# Patient Record
Sex: MALE | Race: WHITE | Employment: OTHER | URBAN - METROPOLITAN AREA
[De-identification: names, ages, dates, MRNs, and addresses within clinical notes are randomized per-mention and may not be internally consistent; named-entity substitution may affect disease eponyms.]

---

## 2023-02-22 ENCOUNTER — TREATMENT (OUTPATIENT)
Dept: PHYSICAL THERAPY | Facility: CLINIC | Age: 63
End: 2023-02-22
Payer: OTHER MISCELLANEOUS

## 2023-02-22 DIAGNOSIS — M54.50 CHRONIC BILATERAL LOW BACK PAIN WITHOUT SCIATICA: Primary | ICD-10-CM

## 2023-02-22 DIAGNOSIS — R26.9 GAIT DISTURBANCE: ICD-10-CM

## 2023-02-22 DIAGNOSIS — G89.29 CHRONIC BILATERAL LOW BACK PAIN WITHOUT SCIATICA: Primary | ICD-10-CM

## 2023-02-22 DIAGNOSIS — R26.89 BALANCE PROBLEM: ICD-10-CM

## 2023-02-22 DIAGNOSIS — R53.1 WEAKNESS: ICD-10-CM

## 2023-02-22 PROCEDURE — 97162 PT EVAL MOD COMPLEX 30 MIN: CPT | Performed by: PHYSICAL THERAPIST

## 2023-02-22 PROCEDURE — 97530 THERAPEUTIC ACTIVITIES: CPT | Performed by: PHYSICAL THERAPIST
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
## 2023-03-08 ENCOUNTER — TREATMENT (OUTPATIENT)
Dept: PHYSICAL THERAPY | Facility: CLINIC | Age: 63
End: 2023-03-08
Payer: OTHER MISCELLANEOUS

## 2023-03-08 DIAGNOSIS — R53.1 WEAKNESS: ICD-10-CM

## 2023-03-08 DIAGNOSIS — R26.89 BALANCE PROBLEM: ICD-10-CM

## 2023-03-08 DIAGNOSIS — G89.29 CHRONIC BILATERAL LOW BACK PAIN WITHOUT SCIATICA: Primary | ICD-10-CM

## 2023-03-08 DIAGNOSIS — R26.9 GAIT DISTURBANCE: ICD-10-CM

## 2023-03-08 DIAGNOSIS — M54.50 CHRONIC BILATERAL LOW BACK PAIN WITHOUT SCIATICA: Primary | ICD-10-CM

## 2023-03-08 PROCEDURE — 97113 AQUATIC THERAPY/EXERCISES: CPT | Performed by: PHYSICAL THERAPIST

## 2023-03-08 NOTE — PROGRESS NOTES
Physical Therapy Daily Treatment Note    [facility] Therapy [facility]  [address] [place] [address]  [phone] (phone)  [phone] (fax)    Patient: Evan Harkins   : 1960  Diagnosis/ICD-10 Code:  Chronic bilateral low back pain without sciatica [M54.50, G89.29]  Referring practitioner: Kwadwo Cruz MD  Date of Initial Visit: Type: THERAPY  Noted: 2023  Today's Date: 3/8/2023  Patient seen for 2 sessions             Subjective   My back feels weak, but good - took pain medicine an hour ago. Has not got results from CT Scan yet.    Objective     AQUATIC THERAPY:    Precautions per MD: No resisted hip abduction  Patient has aphasia and is at risk for falls, needs one on one at all times.    Patient enters/exits the pool via the stairs w/ both hands on rail and CGA, using a non-reciprocal pattern.    Water Walk  Forwards at rail, HHA 25 ft X 3, Backwards 25 ft., HHA             Seated Hamstrings 20 sec each                                     Vertical Traction  1 min.  X 2  Seated breaks as needed    Abdominals  --                                   Hip Abd/Add  --             Hip Flex/Ext  --             March in Place   10X        Sit to Stands X 5, HHA                B Heel Raises  10X                                       Step Ups   --                 Bicycle Suspended 30 sec X 3 and  Seated 20X  Seated LAQs 10X each                               Flutter/Scissor   --             Assessment/Plan  First session of aquatic therapy.  Mike enjoyed being in the warm water, however he became tired quickly and required multiple rest breaks.  Spoke with his wife who states he has not been very active for the past few years.  They did go to the pool at the [facility], however they no longer allow the public to use the pool.  Plan: Gradually advance exercise.          Timed:  Aquatic Therapy    40     mins 66562;    Sergio Aguilar, PT  Physical  Therapist    [place] License:  [id number]

## 2023-03-13 ENCOUNTER — TREATMENT (OUTPATIENT)
Dept: PHYSICAL THERAPY | Facility: CLINIC | Age: 63
End: 2023-03-13
Payer: OTHER MISCELLANEOUS

## 2023-03-13 DIAGNOSIS — R53.1 WEAKNESS: ICD-10-CM

## 2023-03-13 DIAGNOSIS — R26.9 GAIT DISTURBANCE: ICD-10-CM

## 2023-03-13 DIAGNOSIS — R26.89 BALANCE PROBLEM: ICD-10-CM

## 2023-03-13 DIAGNOSIS — M54.50 CHRONIC BILATERAL LOW BACK PAIN WITHOUT SCIATICA: Primary | ICD-10-CM

## 2023-03-13 DIAGNOSIS — G89.29 CHRONIC BILATERAL LOW BACK PAIN WITHOUT SCIATICA: Primary | ICD-10-CM

## 2023-03-13 PROCEDURE — 97113 AQUATIC THERAPY/EXERCISES: CPT | Performed by: PHYSICAL THERAPIST

## 2023-03-13 NOTE — PROGRESS NOTES
Physical Therapy Daily Treatment Note    [facility]  [address] [place] [address]  [phone] (phone)  [phone] (fax)    Patient: Evan Harkins   : 1960  Diagnosis/ICD-10 Code:  Chronic bilateral low back pain without sciatica [M54.50, G89.29]  Referring practitioner: Kwadwo Cruz MD  Date of Initial Visit: Type: THERAPY  Noted: 2023  Today's Date: 3/13/2023  Patient seen for 3 sessions             Subjective   Wife reports Mike is tired today and that it is not uncommon for him to have days like this where he wants to nap/rest more.    Objective     Precautions per MD: No resisted hip abduction  Patient has aphasia and is at risk for falls, needs one on one at all times.     Patient enters/exits the pool via the stairs w/ railing and HHA/ min A, using a non-reciprocal pattern.    Seated: Bicycling legs 15 X 2                Hamstring Stretch 20 sec X 2 each leg                Marching 15X                LAQs  10X                Small foam dumbbell overhead press alternating X 10 each                Small foam DB on top of water Push-Pulls X 10                Small foam DB on top of water shoulder horiz Abd/Add 3 X 5  Water walking holding rail, HHA 25 ft X 2  Standing with back to wall, Large Noodle Pushdowns 3 X 4  March in place 3X  Decompression on Noodle w/ rail support 30 sec.      Assessment/Plan  Mike was tired/sleepy throughout today's session and required frequent cues to keep his eyes open.  His wife states he has days like this.  He did better with short bouts of seated exercises.  He made an innapropriate comment and gesture during the session and his wife was asked to be present in the pool for the next treatment. ( We do not have any male physical therapists working in aquatics to transfer care to).          Timed:  Aquatic Therapy    30     mins 88875;    Sergio Aguilar, PT  Physical Therapist    [place] License:  [id number]

## 2023-03-17 ENCOUNTER — TREATMENT (OUTPATIENT)
Dept: PHYSICAL THERAPY | Facility: CLINIC | Age: 63
End: 2023-03-17
Payer: OTHER MISCELLANEOUS

## 2023-03-17 DIAGNOSIS — M54.50 CHRONIC BILATERAL LOW BACK PAIN WITHOUT SCIATICA: Primary | ICD-10-CM

## 2023-03-17 DIAGNOSIS — R26.89 BALANCE PROBLEM: ICD-10-CM

## 2023-03-17 DIAGNOSIS — R53.1 WEAKNESS: ICD-10-CM

## 2023-03-17 DIAGNOSIS — R26.9 GAIT DISTURBANCE: ICD-10-CM

## 2023-03-17 DIAGNOSIS — G89.29 CHRONIC BILATERAL LOW BACK PAIN WITHOUT SCIATICA: Primary | ICD-10-CM

## 2023-03-17 PROCEDURE — 97113 AQUATIC THERAPY/EXERCISES: CPT | Performed by: PHYSICAL THERAPIST

## 2023-03-17 NOTE — PROGRESS NOTES
Physical Therapy Daily Treatment Note    [facility]  [address] [place] [address]  [phone] (phone)  [phone] (fax)    Patient: Evan Harkins   : 1960  Diagnosis/ICD-10 Code:  Chronic bilateral low back pain without sciatica [M54.50, G89.29]  Referring practitioner: Kwadwo Cruz MD  Date of Initial Visit: Type: THERAPY  Noted: 2023  Today's Date: 3/17/2023  Patient seen for 4 sessions             Subjective   Wife reports he is more alert today.  She states drowsiness may be from a medication change that occurred ~ 2 weeks ago.    Objective     AQUATIC THERAPY:    Patient enters/exits the pool via the stairs w/ railing and HHA/ min A, using a non-reciprocal pattern.     Seated: Bicycling legs 1 minute X 2                Flutter Kick 20 sec X 2                Hamstring Stretch 20 sec X 2 each leg                Ankle Pumps  X 10 each                Marching 10X                LAQs  10X                Sit to Stands X 6                  Water walking holding rail and HHA 25 ft X 2  Free Standing w/ CGA/min A, Large Noodle Pushdowns X 10  March in place --  Decompression on Noodle w/ rail support 10 sec. X2    Assessment/Plan  Mike was much more alert compared to his first 2 visits.  He was more cooperative in performing therapy exercises.  He did tire towards the end of treatment and requested to stop.  His wife was present in the pool during the treatment and helped encourage Mike to participate.  Balance with sit to  pool required SBA for safety and balance with water walking requires HHA plus onr hand support on the railinge   .        Timed:  Aquatic Therapy    30     mins 39195;    Sergio Aguilar, PT  Physical Therapist    [place] License:  [id number]

## 2023-03-22 ENCOUNTER — TREATMENT (OUTPATIENT)
Dept: PHYSICAL THERAPY | Facility: CLINIC | Age: 63
End: 2023-03-22
Payer: OTHER MISCELLANEOUS

## 2023-03-22 DIAGNOSIS — R26.89 BALANCE PROBLEM: ICD-10-CM

## 2023-03-22 DIAGNOSIS — M54.50 CHRONIC BILATERAL LOW BACK PAIN WITHOUT SCIATICA: Primary | ICD-10-CM

## 2023-03-22 DIAGNOSIS — R26.9 GAIT DISTURBANCE: ICD-10-CM

## 2023-03-22 DIAGNOSIS — R53.1 WEAKNESS: ICD-10-CM

## 2023-03-22 DIAGNOSIS — G89.29 CHRONIC BILATERAL LOW BACK PAIN WITHOUT SCIATICA: Primary | ICD-10-CM

## 2023-03-22 PROCEDURE — 97113 AQUATIC THERAPY/EXERCISES: CPT | Performed by: PHYSICAL THERAPIST

## 2023-03-22 NOTE — PROGRESS NOTES
"Physical Therapy 30-Day  Progress Note     [facility]  [address] [place] [address]  [phone] (phone)  [phone] (fax)    Patient: Evan Harkins   : 1960  Diagnosis/ICD-10 Code:  Chronic bilateral low back pain without sciatica [M54.50, G89.29]  Referring practitioner: Kwadwo Cruz MD  Date of Initial Visit: Type: THERAPY  Noted: 2023  Today's Date: 3/22/2023  Patient seen for 5 sessions      Subjective:     Clinical Progress: unchanged  Home Program Compliance: N/A, No access to pool at this time  Treatment has included:  aquatic therapy    Subjective \" I want to do the stretching first.\"  Pain rating 6/10.    Objective     Gait: Ambulates with rollator with flexed posture, SBA secondary to impulsive nature and risk for falls    Transfers Sit to Stand: Requires moderate to significant UE support on chair arms, SBA    Functional outcome score: Oswestry 72%       AQUATIC THERAPY:     Patient enters/exits the pool via the stairs w/ railing and HHA, using a non-reciprocal pattern.     Seated: Bicycling legs 30 sec X 3                Flutter Kick --                Hamstring Stretch 20 sec X 2 each leg                LAQs  10X                Sit to Stands (No hand support) X 6  Standing:  Balance training -Stand Tall w/o UE support, SBA X 30 sec.              Mini Squats X 5  Water walking Forwards w/ HHA of 2, 25 ft X 5  Abdominals -Free Standing w/ CGA, Large Noodle Pushdowns X 5, Seated X 5       Assessment/Plan  Evan is here today for his 4th session of aquatic therapy.  He was very tired upon arrival of his first 2 appointments which made his participation difficult.  The last 2 treatments he was more alert and able to perform more standing and walking activities.  His wife reports he has been sedentary for awhile.   His back pain affects his ability to walk longer distances, stand, bathe and sleep.  He has not met any goals at this time, " although he is slowly making progress with activities performed in physical therapy.     Goals   STG: to be met by 6 weeks  1- Patient will report pain < 3 /10 with light household activities.  ONGOING  2-Patient will transfer sit to stand without upper extremities assist and proper anterior weight shift for safety. ONGOING   3-Patient and care giver will be independent with aquatic HEP without compensation or exacerbation. ONGOING     Recommendations: Continue as planned  Timeframe: 1 month  Prognosis to achieve goals: good    PT Signature: Sergio Aguilar, PT  [place] License: [id number]      Based upon review of the patient's progress and continued therapy plan, it is my medical opinion that Evan Harkins should continue physical therapy treatment at [facility] PHYSICAL THERAPY  [address] [place] [address] [address] [place] [id number] [phone].    Signature: __________________________________  Kwadwo Cruz MD  NPI: [id number]                                          Timed:  Aquatic therapy  [address]    25   mins

## 2023-03-24 ENCOUNTER — TELEPHONE (OUTPATIENT)
Dept: PHYSICAL THERAPY | Facility: CLINIC | Age: 63
End: 2023-03-24

## 2023-03-29 ENCOUNTER — TREATMENT (OUTPATIENT)
Dept: PHYSICAL THERAPY | Facility: CLINIC | Age: 63
End: 2023-03-29
Payer: OTHER MISCELLANEOUS

## 2023-03-29 DIAGNOSIS — R26.89 BALANCE PROBLEM: ICD-10-CM

## 2023-03-29 DIAGNOSIS — M54.50 CHRONIC BILATERAL LOW BACK PAIN WITHOUT SCIATICA: Primary | ICD-10-CM

## 2023-03-29 DIAGNOSIS — R26.9 GAIT DISTURBANCE: ICD-10-CM

## 2023-03-29 DIAGNOSIS — G89.29 CHRONIC BILATERAL LOW BACK PAIN WITHOUT SCIATICA: Primary | ICD-10-CM

## 2023-03-29 DIAGNOSIS — R53.1 WEAKNESS: ICD-10-CM

## 2023-03-29 PROCEDURE — 97113 AQUATIC THERAPY/EXERCISES: CPT | Performed by: PHYSICAL THERAPIST

## 2023-03-29 NOTE — PROGRESS NOTES
Physical Therapy Daily Treatment Note    [facility]  [address] [place] [address]  [phone] (phone)  [phone] (fax)    Patient: Evan Harkins   : 1960  Diagnosis/ICD-10 Code:  Chronic bilateral low back pain without sciatica [M54.50, G89.29]  Referring practitioner: Kwadwo Cruz MD  Date of Initial Visit: Type: THERAPY  Noted: 2023  Today's Date: 3/29/2023  Patient seen for 6 sessions             Subjective   My back is doing okay today.    Objective     Precautions per MD: No resisted hip abduction  Patient has aphasia and is at risk for falls, needs one on one at all times.     AQUATIC THERAPY:     Patient enters/exits the pool via the stairs w/ railing and HHA, using a non-reciprocal pattern.     Seated: Bicycling legs 1 min 30 sec.                Flutter Kick --                Hamstring Stretch 20 sec X 2 each leg                LAQs  20X                Sit to Stands (No hand support) X 10, SBA  Decompression at rail w/ Noodle 30 sec X 2    Water walking Forwards w/ noodle support, CGA/min Assist, 25 ft X 4    Standing:  Abdominals  CGA, Large Noodle Pushdowns X 10  Tandem Walk holding rail 5 ft  Attempted Uni-Clock training for balance holding the rail, however Mike was unable to follow directions to perform the exercise properly and it was discontinued       Assessment/Plan  Mike is gradually increasing his participation in aquatic therapy with frequent encouragement.  He tires easily and requires frequent rest breaks.  His gait is wide based with unsteadiness and on 2 occasions required minimal assist to keep from falling. Next visit will include more task oriented activities to work on balance.          Timed:  Aquatic Therapy    25     mins 96275;    Sergio Aguilar, PT  Physical Therapist    [place] License:  [id number]

## 2023-03-31 ENCOUNTER — TREATMENT (OUTPATIENT)
Dept: PHYSICAL THERAPY | Facility: CLINIC | Age: 63
End: 2023-03-31
Payer: OTHER MISCELLANEOUS

## 2023-03-31 DIAGNOSIS — M54.50 CHRONIC BILATERAL LOW BACK PAIN WITHOUT SCIATICA: Primary | ICD-10-CM

## 2023-03-31 DIAGNOSIS — R26.9 GAIT DISTURBANCE: ICD-10-CM

## 2023-03-31 DIAGNOSIS — G89.29 CHRONIC BILATERAL LOW BACK PAIN WITHOUT SCIATICA: Primary | ICD-10-CM

## 2023-03-31 DIAGNOSIS — R53.1 WEAKNESS: ICD-10-CM

## 2023-03-31 DIAGNOSIS — R26.89 BALANCE PROBLEM: ICD-10-CM

## 2023-03-31 PROCEDURE — 97113 AQUATIC THERAPY/EXERCISES: CPT | Performed by: PHYSICAL THERAPIST

## 2023-03-31 NOTE — PROGRESS NOTES
Physical Therapy Daily Treatment Note    [facility]  [address] [place], [place] [address] [phone] (phone)  [phone] (fax)    Patient: Evan Harkins   : 1960  Diagnosis/ICD-10 Code:  Chronic bilateral low back pain without sciatica [M54.50, G89.29]  Referring practitioner: Kwadwo Cruz MD  Date of Initial Visit: Type: THERAPY  Noted: 2023  Today's Date: 3/31/2023  Patient seen for 7 sessions             Subjective   My back feels better but still hurts.    Objective        Precautions per MD: No resisted hip abduction  Patient has aphasia and is at risk for falls, needs one on one at all times.     AQUATIC THERAPY:     Patient enters/exits the pool via the stairs w/ railing and CGA, using a non-reciprocal pattern.     Water walking Forwards 25 ft X 5, SBA  8 inch Step Ups X 10 each     Standing:  Ball toss/Catch X 10, wife provides SBA for balance  Ball Toss into box X 10  Leg Press w/ lg foam ring X 15  Abdominals  CGA, Large Noodle Pushdowns X 10       Assessment/Plan:  Mike improved his activity tolerance during today's session - all exercises performed in standing, - no seated exercises this visit.  We added neuromuscular reeducation training with ball toss/catch (without LOB). Walking without support of foam noodle and only needing SBA, with occasional unsteadiness.          Timed:  Aquatic Therapy    25     mins 69347;    Sergio Aguilar, PT  Physical Therapist    [place] License:  [id number]

## 2023-04-03 ENCOUNTER — TELEPHONE (OUTPATIENT)
Dept: PHYSICAL THERAPY | Facility: OTHER | Age: 63
End: 2023-04-03

## 2023-04-07 ENCOUNTER — TREATMENT (OUTPATIENT)
Dept: PHYSICAL THERAPY | Facility: CLINIC | Age: 63
End: 2023-04-07
Payer: OTHER MISCELLANEOUS

## 2023-04-07 DIAGNOSIS — M54.50 CHRONIC BILATERAL LOW BACK PAIN WITHOUT SCIATICA: Primary | ICD-10-CM

## 2023-04-07 DIAGNOSIS — G89.29 CHRONIC BILATERAL LOW BACK PAIN WITHOUT SCIATICA: Primary | ICD-10-CM

## 2023-04-07 DIAGNOSIS — R53.1 WEAKNESS: ICD-10-CM

## 2023-04-07 DIAGNOSIS — R26.9 GAIT DISTURBANCE: ICD-10-CM

## 2023-04-07 DIAGNOSIS — R26.89 BALANCE PROBLEM: ICD-10-CM

## 2023-04-07 PROCEDURE — 97113 AQUATIC THERAPY/EXERCISES: CPT | Performed by: PHYSICAL THERAPIST

## 2023-04-07 NOTE — PROGRESS NOTES
Physical Therapy Daily Treatment Note    [facility]  [address] [place] [id number]  [phone] (phone)  [phone] (fax)    Patient: Evan Harkins   : 1960  Diagnosis/ICD-10 Code:  Chronic bilateral low back pain without sciatica [M54.50, G89.29]  Referring practitioner: Kwadwo Cruz MD  Date of Initial Visit: Type: THERAPY  Noted: 2023  Today's Date: 2023  Patient seen for 8 sessions             Subjective   Wife reports they had to cancel the last appointment because she couldn't get him to come.    Objective     Precautions per MD: No resisted hip abduction  Patient has aphasia and is at risk for falls, needs one on one at all times.     AQUATIC THERAPY:     Patient enters/exits the pool via the stairs w/ railing and CGA, using a non-reciprocal pattern.     Water walking Forwards X 7 min, SBA/CGA with intermittent rest breaks  8 inch Step Ups X 10 each Deferred     Standing:  Ball toss/Catch X 10, SBA for balance training  Leg Press w/ large Noodle X 10 each  Abdominals  CGA, Large Noodle Pushdowns X 10  MIP X 10  B Heel raises X 10    Supine on Noodle suspended Bicycling X 20    Assessment/Plan  Mike is gradually becoming more active in aquatic therapy week by week.  His balance seems to vary with water walking, today he was more unsteady than last week.  He does tire easily and requires frequent rest breaks.  Mike has follow up Dr christina next week.          Timed:  Aquatic Therapy    23     mins 01342;    Sergio Aguilar, PT  Physical Therapist    [place] License:  [id number]

## 2023-04-10 ENCOUNTER — TREATMENT (OUTPATIENT)
Dept: PHYSICAL THERAPY | Facility: CLINIC | Age: 63
End: 2023-04-10
Payer: OTHER MISCELLANEOUS

## 2023-04-10 DIAGNOSIS — M54.50 CHRONIC BILATERAL LOW BACK PAIN WITHOUT SCIATICA: Primary | ICD-10-CM

## 2023-04-10 DIAGNOSIS — R26.9 GAIT DISTURBANCE: ICD-10-CM

## 2023-04-10 DIAGNOSIS — R26.89 BALANCE PROBLEM: ICD-10-CM

## 2023-04-10 DIAGNOSIS — R53.1 WEAKNESS: ICD-10-CM

## 2023-04-10 DIAGNOSIS — G89.29 CHRONIC BILATERAL LOW BACK PAIN WITHOUT SCIATICA: Primary | ICD-10-CM

## 2023-04-10 PROCEDURE — 97113 AQUATIC THERAPY/EXERCISES: CPT | Performed by: PHYSICAL THERAPIST

## 2023-04-10 NOTE — PROGRESS NOTES
Physical Therapy Daily Treatment Note    [facility] Physical Therapy [facility]  [address] [place] [address]  [phone] (phone)  [phone] (fax)    Patient: Evan Harkins   : 1960  Diagnosis/ICD-10 Code:  Chronic bilateral low back pain without sciatica [M54.50, G89.29]  Referring practitioner: Kwadwo Cruz MD  Date of Initial Visit: Type: THERAPY  Noted: 2023  Today's Date: 4/10/2023  Patient seen for 9 sessions             Subjective   Mike's wife states he is tired after water therapy including the next day.    Objective     Precautions per MD: No resisted hip abduction  Patient has aphasia and is at risk for falls, needs one on one at all times.     AQUATIC THERAPY:     Patient enters/exits the pool via the stairs w/ railing and CGA, using a non-reciprocal pattern.     Water walking Forwards 25 ft X 5, SBA/CGA with intermittent rest breaks  8 inch Step Ups X 10 each Deferred  Leg Press in supine float with noodle, feet on wall, min assist X 5     Standing:  Ball toss/Catch X 10, SBA for balance training  Leg Press w/ large Noodle --  Abdominals  CGA, Large Noodle Pushdowns X 10  MIP X 10  B Heel raises X 20     Supine on Noodle suspended Bicycling X 20, seated cycling X 10    Assessment/Plan  5 X Sit to Stand Test: hands on thighs 21 sec.  Oswestry score 74%  Note sent to MD (scanned into [employer]), Dr christina tomorrow.        Timed:  Aquatic Therapy    40     mins 62462;    Sergio Aguilar, PT  Physical Therapist    [place] License:  [id number]

## 2023-04-14 ENCOUNTER — TREATMENT (OUTPATIENT)
Dept: PHYSICAL THERAPY | Facility: CLINIC | Age: 63
End: 2023-04-14
Payer: OTHER MISCELLANEOUS

## 2023-04-14 DIAGNOSIS — M54.50 CHRONIC BILATERAL LOW BACK PAIN WITHOUT SCIATICA: Primary | ICD-10-CM

## 2023-04-14 DIAGNOSIS — R53.1 WEAKNESS: ICD-10-CM

## 2023-04-14 DIAGNOSIS — R26.89 BALANCE PROBLEM: ICD-10-CM

## 2023-04-14 DIAGNOSIS — G89.29 CHRONIC BILATERAL LOW BACK PAIN WITHOUT SCIATICA: Primary | ICD-10-CM

## 2023-04-14 DIAGNOSIS — R26.9 GAIT DISTURBANCE: ICD-10-CM

## 2023-04-14 PROCEDURE — 97113 AQUATIC THERAPY/EXERCISES: CPT | Performed by: PHYSICAL THERAPIST

## 2023-04-14 NOTE — PROGRESS NOTES
Physical Therapy Daily Treatment Note    [facility] Physical Therapy Milestone  [address] [id number]  [phone] (phone)  [phone] (fax)    Patient: Evan Harkins   : 1960  Diagnosis/ICD-10 Code:  Chronic bilateral low back pain without sciatica [M54.50, G89.29]  Referring practitioner: Kwadwo Cruz MD  Date of Initial Visit: Type: THERAPY  Noted: 2023  Today's Date: 2023  Patient seen for 10 sessions             Subjective   Saw Dr Cruz who said no need for surgery (compression fractures in spine) and to continue physical therapy.    Objective     Precautions per MD: No resisted hip abduction  Patient has aphasia and is at risk for falls, needs one on one at all times.     AQUATIC THERAPY:    Used cooler lap pool today due to large group class in therapy pool that is distracting for Mike.  Enters/exit the pool via the stairs w/ railing and CGA, using a non-reciprocal pattern, needs verbal cues (has impaired depth perception)     Water walking Forwards 40 yds with ledge support and HHA initially, then with SBA  Retro walk 15 yds, SBA  In semi supine float - bicycling legs 20 X 2, CGA  Abdominals Large Noodle Pushdowns X 35, SBA  Leg Press with large foam noodle X 30 each, SBA  Rows with closed paddles, back to wall X 30, SBA   Ball toss into target  X 12, moving target position periodically, SBA          Assessment/Plan  Mike was able to perform more repetitions of exercises today.  We did use the cooler lap pool that had much less distractions than the warmer therapy pool.  Also, Mike's wife used their transport chair to bring him in today instead of him walking in with his rollator which may have helped his energy.  He is also starting a new medicine to improve his alertness.          Timed:  Aquatic Therapy    39     mins [id number];    Sergio Aguilar, PT  Physical Therapist    [place] License:  [id number]

## 2023-04-17 ENCOUNTER — TREATMENT (OUTPATIENT)
Dept: PHYSICAL THERAPY | Facility: CLINIC | Age: 63
End: 2023-04-17
Payer: OTHER MISCELLANEOUS

## 2023-04-17 DIAGNOSIS — M54.50 CHRONIC BILATERAL LOW BACK PAIN WITHOUT SCIATICA: Primary | ICD-10-CM

## 2023-04-17 DIAGNOSIS — R26.89 BALANCE PROBLEM: ICD-10-CM

## 2023-04-17 DIAGNOSIS — R53.1 WEAKNESS: ICD-10-CM

## 2023-04-17 DIAGNOSIS — R26.9 GAIT DISTURBANCE: ICD-10-CM

## 2023-04-17 DIAGNOSIS — G89.29 CHRONIC BILATERAL LOW BACK PAIN WITHOUT SCIATICA: Primary | ICD-10-CM

## 2023-04-17 PROCEDURE — 97113 AQUATIC THERAPY/EXERCISES: CPT | Performed by: PHYSICAL THERAPIST

## 2023-04-17 NOTE — PROGRESS NOTES
Physical Therapy Daily Treatment Note    [facility] Physical Therapy Milestone  [address] [place] [address]  [phone] (phone)  [phone] (fax)    Patient: Evan Harkins   : 1960  Diagnosis/ICD-10 Code:  Chronic bilateral low back pain without sciatica [M54.50, G89.29]  Referring practitioner: Kwadwo Cruz MD  Date of Initial Visit: Type: THERAPY  Noted: 2023  Today's Date: 2023  Patient seen for 11 sessions             Subjective   Mike's wife reports he was very tired after the last treatment for 2 days.    Objective     Precautions per MD: No resisted hip abduction  Patient has aphasia and is at risk for falls, needs one on one at all times.     AQUATIC THERAPY:    Treatment in cooler lap pool  Enters/exit the pool via the stairs w/ railing and CGA, using a non-reciprocal pattern, needs verbal cues (has impaired depth perception)     Water walking Forwards 40 yds with intermittent ledge support  with SBA  Retro walk 20 yds, SBA  March Walk 15 yds, SBA   Straddling Noodle -bicycling legs and dog paddle arms X 25 yds, SBA occasional assist for balance  In semi supine float - bicycling legs 20 X, SBA  Abdominals Lg Noodle Pushdowns X 10, SBA  Leg Press with large foam ring  X 10 each, SBA  Rows with closed paddles, back to wall X 16, SBA   Ball toss into target  X 12, moving target position periodically, SBA          Assessment/Plan  Mike was very tired for 2 days after the last treatment with very limited activity at home.  Decreased repetitions today.  Mike took more frequent rest breaks today, however of shorter duration.  He reported his back feeling better mid way through the treatment.          Timed:  Aquatic Therapy    28     mins [id number];    Sergio Aguilar, PT  Physical Therapist    [place] License:  [id number]

## 2023-04-21 ENCOUNTER — TREATMENT (OUTPATIENT)
Dept: PHYSICAL THERAPY | Facility: CLINIC | Age: 63
End: 2023-04-21
Payer: OTHER MISCELLANEOUS

## 2023-04-21 DIAGNOSIS — M54.50 CHRONIC BILATERAL LOW BACK PAIN WITHOUT SCIATICA: Primary | ICD-10-CM

## 2023-04-21 DIAGNOSIS — R26.9 GAIT DISTURBANCE: ICD-10-CM

## 2023-04-21 DIAGNOSIS — R26.89 BALANCE PROBLEM: ICD-10-CM

## 2023-04-21 DIAGNOSIS — G89.29 CHRONIC BILATERAL LOW BACK PAIN WITHOUT SCIATICA: Primary | ICD-10-CM

## 2023-04-21 DIAGNOSIS — R53.1 WEAKNESS: ICD-10-CM

## 2023-04-21 PROCEDURE — 97113 AQUATIC THERAPY/EXERCISES: CPT | Performed by: PHYSICAL THERAPIST

## 2023-04-21 NOTE — PROGRESS NOTES
Physical Therapy Daily Treatment Note    [facility] Physical Therapy Milestone  [address] [place] [address]  [phone] (phone)  [phone] (fax)    Patient: Evan Harkins   : 1960  Diagnosis/ICD-10 Code:  Chronic bilateral low back pain without sciatica [M54.50, G89.29]  Referring practitioner: Kwadwo Cruz MD  Date of Initial Visit: Type: THERAPY  Noted: 2023  Today's Date: 2023  Patient seen for 12 sessions             Subjective   No back pain while sitting in chair waiting to start session.    Objective     Precautions per MD: No resisted hip abduction  Patient has aphasia and is at risk for falls, needs one on one at all times.     AQUATIC THERAPY:    Treatment in cooler lap pool  Enters/exit the pool via the stairs w/ railing and CGA, using a non-reciprocal pattern, needs verbal cues (has impaired depth perception)     Water walking Forwards 25 yds X 3 with intermittent ledge support  with SBA  Retro walk 40 yds, SBA  March Walk 15 yds, SBA   Straddling Noodle - dog paddle arms X 20 ft, SBA occasional assist for balance  In semi supine float - bicycling legs 1 min X 2, SBA  Abdominals Lg Noodle Pushdowns  2 X 10, SBA  Leg Press with large foam ring  X 20 each, SBA  Rows with closed paddles, w/o wall support X 20, SBA          Assessment/Plan  Evan needs less assist for balance during water walking while in aqua therapy.  He did not need wall support to perform Row exercise resisted with paddles.          Timed:  Aquatic Therapy    30     mins [id number];    Sergio Aguilar, PT  Physical Therapist    [place] License:  [id number]

## 2023-04-28 ENCOUNTER — TREATMENT (OUTPATIENT)
Dept: PHYSICAL THERAPY | Facility: CLINIC | Age: 63
End: 2023-04-28
Payer: OTHER MISCELLANEOUS

## 2023-04-28 DIAGNOSIS — R26.9 GAIT DISTURBANCE: ICD-10-CM

## 2023-04-28 DIAGNOSIS — R26.89 BALANCE PROBLEM: ICD-10-CM

## 2023-04-28 DIAGNOSIS — G89.29 CHRONIC BILATERAL LOW BACK PAIN WITHOUT SCIATICA: Primary | ICD-10-CM

## 2023-04-28 DIAGNOSIS — M54.50 CHRONIC BILATERAL LOW BACK PAIN WITHOUT SCIATICA: Primary | ICD-10-CM

## 2023-04-28 DIAGNOSIS — R53.1 WEAKNESS: ICD-10-CM

## 2023-04-28 PROCEDURE — 97113 AQUATIC THERAPY/EXERCISES: CPT | Performed by: PHYSICAL THERAPIST

## 2023-04-28 NOTE — PROGRESS NOTES
Physical Therapy Progress Note    [facility] Physical Therapy Milestone  [address] [place] [address]  [phone] (phone)  [phone] (fax)    Patient: Evan Harkins   : 1960  Diagnosis/ICD-10 Code:  Chronic bilateral low back pain without sciatica [M54.50, G89.29]  Referring practitioner: Kwadwo Cruz MD  Date of Initial Visit: Type: THERAPY  Noted: 2023  Today's Date: 2023  Patient seen for 13 sessions             Subjective   Persistent back pain.  Low energy per wife with sedentary lifestyle.    Objective      MMT:   Hip Flexion 4/5 B  Knee Extension  4/5 B  Knee Flexion  4/5 B  Ankle DF  4+/5 B    Hamstring Flexibility is limited B.    5 X Sit to Stand Test: completed in 21 seconds w/ hands on thighs  (Norm is < 12 sec.)    Oswestry Score:  62%     Precautions per MD: No resisted hip abduction  Patient has aphasia and is at risk for falls, needs one on one at all times.     AQUATIC THERAPY:    Treatment in cooler lap pool  Enters/exit the pool via the stairs w/ railing and CGA, using a non-reciprocal pattern, needs verbal cues (has impaired depth perception)     Water walking Forwards 25 yds X 2 w/o hand support, SBA  Retro 15 yds, SBA  March Walk 20 yds, SBA   Hamstring Stretch 5 sec X 2  B Heel Raises X 10  Balance/Coordination training w/ Throwing small ball into basketball hoop 2 X 10, SBA  Abdominals Lg Noodle Pushdowns  X 15, SBA  Leg Press with large foam ring  X 10 each, SBA  Rows with closed paddles, w/o wall support X 30, SBA     Assessment/Plan  Evan has attended 12 sessions of aquatic therapy.  His balance in the pool has improved and he is able to walk with SBA.  He also demonstrates improved balance with ball toss to target and catching ball with SBA and no LOB. His activity tolerance to aquatic exercise has improved when compared to a month ago. He has met 2 of 3 short term goals  and 2 of 4 long term goals.  Evan would benefit from having  access to a pool (membership) to continue therapeutic exercise with the assistance of his wife.       STGs:  1- Patient will report pain < 3 /10 with light household activities. ONGOING  2-Patient will transfer sit to stand without upper extremities assist and proper anterior weight shift for safety. MET   3-Patient and care giver will be independent with aquatic HEP without compensation or exacerbation. MET    LTGs:  1-Pt will report pain < 2 /10 with recreational activities. ONGOING   2-Pt will increase SLR to at least 50  without compensation or exacerbation of back pain.  ONGOING  3-Patient will increase strength to at least 3+ /5 to perform closed chain activities with least compensation. MET  4-Pt and caregiver will be independent with comprehsive  Aquatic HOME EXERCISE PROGRAM.  MET      Plan:  Continue aquatic therapy 2X/week for 3 more visits.  Recommend pool membership for continued exercise with the assistance of his wife.      Timed:  Aquatic Therapy    38     mins [id number];    Sergio Aguilar, PT  Physical Therapist    [place] License:  [id number]

## 2023-05-01 ENCOUNTER — TREATMENT (OUTPATIENT)
Dept: PHYSICAL THERAPY | Facility: CLINIC | Age: 63
End: 2023-05-01
Payer: OTHER MISCELLANEOUS

## 2023-05-01 DIAGNOSIS — R26.89 BALANCE PROBLEM: ICD-10-CM

## 2023-05-01 DIAGNOSIS — R53.1 WEAKNESS: ICD-10-CM

## 2023-05-01 DIAGNOSIS — M54.50 CHRONIC BILATERAL LOW BACK PAIN WITHOUT SCIATICA: Primary | ICD-10-CM

## 2023-05-01 DIAGNOSIS — G89.29 CHRONIC BILATERAL LOW BACK PAIN WITHOUT SCIATICA: Primary | ICD-10-CM

## 2023-05-01 DIAGNOSIS — R26.9 GAIT DISTURBANCE: ICD-10-CM

## 2023-05-01 PROCEDURE — 97113 AQUATIC THERAPY/EXERCISES: CPT | Performed by: PHYSICAL THERAPIST

## 2023-05-01 NOTE — PROGRESS NOTES
Physical Therapy Daily Treatment Note    [facility] Physical Therapy Milestone  [address] [place] [address]  [phone] (phone)  [phone] (fax)    Patient: Evan Harkins   : 1960  Diagnosis/ICD-10 Code:  Chronic bilateral low back pain without sciatica [M54.50, G89.29]  Referring practitioner: Kwadwo Cruz MD  Date of Initial Visit: Type: THERAPY  Noted: 2023  Today's Date: 2023  Patient seen for 14 sessions             Subjective   Back pain rated 7/10 today.    Objective     Cooler pool not available this afternoon, due to a large group class, therefore we used the warmer therapy pool.    Precautions per MD: No resisted hip abduction  Patient has aphasia and is at risk for falls, needs one on one at all times.    AQUATIC EXERCISE:  Water walking Forwards 25 yds X 2 w/o hand support, SBA  Retro 15 yds, SBA  March Walk 20 yds, SBA  Deferred  Hamstring Stretch 5 sec X 2 Deferred  B Heel Raises X 10  Balance/Coordination training w/ Throwing small ball into basketball hoop  X 105, SBA  Abdominals Lg Noodle Pushdowns  X 15, SBA  Leg Press with large foam ring  X 15 each, SBA  Rows with closed paddles, w/o wall support X 30, SBA   LAQs seated  X 10  Step Ups right 6 ich  X 10 and left 4 inch X 10     Assessment/Plan  Progressed to step up exercise, Evan did not have the strength needed to perform a 6 inch step up on his left LE without compensation of pulling heavily on the railing.  He was able to perform a 4 inch step.  Lisandra is very involved in Evan's care and assists him with the exercises as needed.  He responds well to her instructions.          Timed:  Aquatic Therapy    25     mins [id number];    Sergio Aguilar, PT  Physical Therapist    [place] License:  [id number]

## 2023-05-03 ENCOUNTER — TREATMENT (OUTPATIENT)
Dept: PHYSICAL THERAPY | Facility: CLINIC | Age: 63
End: 2023-05-03
Payer: OTHER MISCELLANEOUS

## 2023-05-03 DIAGNOSIS — R53.1 WEAKNESS: ICD-10-CM

## 2023-05-03 DIAGNOSIS — M54.50 CHRONIC BILATERAL LOW BACK PAIN WITHOUT SCIATICA: Primary | ICD-10-CM

## 2023-05-03 DIAGNOSIS — R26.89 BALANCE PROBLEM: ICD-10-CM

## 2023-05-03 DIAGNOSIS — G89.29 CHRONIC BILATERAL LOW BACK PAIN WITHOUT SCIATICA: Primary | ICD-10-CM

## 2023-05-03 DIAGNOSIS — R26.9 GAIT DISTURBANCE: ICD-10-CM

## 2023-05-03 NOTE — PROGRESS NOTES
"Physical Therapy Daily Treatment Note    [facility] Physical Therapy Milestone  [address] [place] [address]  [phone] (phone)  [phone] (fax)    Patient: Evan Harkins   : 1960  Diagnosis/ICD-10 Code:  Chronic bilateral low back pain without sciatica [M54.50, G89.29]  Referring practitioner: Kwadwo Cruz MD  Date of Initial Visit: Type: THERAPY  Noted: 2023  Today's Date: 5/3/2023  Patient seen for 15 sessions             Subjective   \"The water exercise is helping me.\"    Objective     AQUATIC EXERCISE:  Water walking Forwards 25 ft x 4, SBA  March Walk 20 ft X 2, SBA    Hamstring Stretch standing w/ wall support 10 sec X 2 each  B Heel Raises X 10  Balance and Eye/Hand Coordination training - throwing small ball into basketball hoop  X 15, w/ supervision  Trial of under water swim x 12 ft. SBA  Abdominals Lg Noodle Pushdowns  X 15, SBA  Leg Press with large foam ring  X 15 each, SBA  Rows with closed paddles, w/o wall support X 30, SBA   Shoulder Abd/Add w/ medium foam dumbbells 2 X 10  Step Ups  4 inch X 10 each   Tandem Walk holding rail 20 ft X 2  Seated bicyling legs   Mini Squats  --      Assessment/Plan  Evan demonstrates improved balance and coordination with ball toss into target.  He is able to use a stepping strategy to re gain balance. He was more alert today (possibly related to later time of day) and performed more activities with less rest breaks.  His wife is in the pool to encourage him and to assist him in performing the exercises properly as well as for his safety.  Evan would benefit from a pool membership to continue water exercise/activity with the help pf his wife.          Timed:  Aquatic Therapy    38     mins [id number];    Sergio Aguilar, PT  Physical Therapist    [place] License:  [id number]  "

## 2023-05-10 ENCOUNTER — TREATMENT (OUTPATIENT)
Dept: PHYSICAL THERAPY | Facility: CLINIC | Age: 63
End: 2023-05-10
Payer: OTHER MISCELLANEOUS

## 2023-05-10 DIAGNOSIS — M54.50 CHRONIC BILATERAL LOW BACK PAIN WITHOUT SCIATICA: Primary | ICD-10-CM

## 2023-05-10 DIAGNOSIS — R26.9 GAIT DISTURBANCE: ICD-10-CM

## 2023-05-10 DIAGNOSIS — R26.89 BALANCE PROBLEM: ICD-10-CM

## 2023-05-10 DIAGNOSIS — G89.29 CHRONIC BILATERAL LOW BACK PAIN WITHOUT SCIATICA: Primary | ICD-10-CM

## 2023-05-10 DIAGNOSIS — R53.1 WEAKNESS: ICD-10-CM

## 2023-05-10 PROCEDURE — 97113 AQUATIC THERAPY/EXERCISES: CPT | Performed by: PHYSICAL THERAPIST

## 2023-05-10 NOTE — PROGRESS NOTES
Physical Therapy Daily Treatment Note    [facility] Physical Therapy Milestone  [address] [place] [address]  [phone] (phone)  [phone] (fax)    Patient: Evan Harkins   : 1960  Diagnosis/ICD-10 Code:  Chronic bilateral low back pain without sciatica [M54.50, G89.29]  Referring practitioner: Kwadwo Cruz MD  Date of Initial Visit: Type: THERAPY  Noted: 2023  Today's Date: 5/10/2023  Patient seen for 16 sessions             Subjective   Back hurts.     Objective     AQUATIC EXERCISE:  Water walking Forwards 15 ft x 4, SBA  March Walk 15 ft X 2, SBA    Hamstring Stretch seated 10 sec X 2 each  B Heel Raises X 10  Balance and Eye/Hand Coordination training - throwing small ball into basketball hoop  X 15  Uni-Clock X 3  Abdominals Lg Noodle Pushdowns  X 15, SBA  Leg Press with large foam ring  X 10 each, SBA  Rows with closed paddles X 10, SBA   Shoulder Abd/Add w/ medium foam dumbbells  X 10  Step Ups  4 inch X 10 each   Tandem Walk intermittent use of rail rail 20 ft   Seated bicyling legs  --  Mini Squats  10x    Assessment/Plan  Attempted progression from 4 inch step up to 6 inch step on the left LE, however Evan demonstrates compensation due to weakness and therefore he returned to the 4 inch step.  He is able to perform 6 inch step ups on his right LE.  His balance has improved with water walking and he no longer requires support of the railing, (SBA for safety). He uses rail support occasionally during march walk and tandem walk. Evan is able to use resistive equipment for strengthening in the pool.    STGs:  1- Patient will report pain < 3 /10 with light household activities. Difficult to assess accurately due to patient's aphasia  2-Patient will transfer sit to stand without upper extremities assist and proper anterior weight shift for safety. MET   3-Patient and care giver will be independent with aquatic HEP without compensation or exacerbation.  MET     LTGs:  1-Pt will report pain < 2 /10 with recreational activities. Difficult to assess due to patient's aphasia  2-Pt will increase SLR to at least 50  without compensation or exacerbation of back pain.  Unable to assess poolside  3-Patient will increase strength to at least 3+ /5 to perform closed chain activities with least compensation. MET  4-Pt and caregiver will be independent with comprehsive  Aquatic HOME EXERCISE PROGRAM.  MET      Plan:  Recommend pool membership where Evan with the assistance of his wife can continue with aquatic exercise.       Timed:  Aquatic Therapy    25     mins [id number];    Sergio Aguilar, PT  Physical Therapist    [place] License:  [id number]